# Patient Record
Sex: MALE | Race: NATIVE HAWAIIAN OR OTHER PACIFIC ISLANDER | HISPANIC OR LATINO
[De-identification: names, ages, dates, MRNs, and addresses within clinical notes are randomized per-mention and may not be internally consistent; named-entity substitution may affect disease eponyms.]

---

## 2020-09-16 PROBLEM — Z00.00 ENCOUNTER FOR PREVENTIVE HEALTH EXAMINATION: Status: ACTIVE | Noted: 2020-09-16

## 2020-10-16 ENCOUNTER — APPOINTMENT (OUTPATIENT)
Dept: GASTROENTEROLOGY | Facility: CLINIC | Age: 50
End: 2020-10-16

## 2020-10-29 ENCOUNTER — APPOINTMENT (OUTPATIENT)
Dept: OTOLARYNGOLOGY | Facility: CLINIC | Age: 50
End: 2020-10-29

## 2020-11-19 ENCOUNTER — APPOINTMENT (OUTPATIENT)
Dept: UROLOGY | Facility: CLINIC | Age: 50
End: 2020-11-19

## 2023-08-21 ENCOUNTER — EMERGENCY (EMERGENCY)
Facility: HOSPITAL | Age: 53
LOS: 0 days | Discharge: HOME | End: 2023-08-21
Attending: STUDENT IN AN ORGANIZED HEALTH CARE EDUCATION/TRAINING PROGRAM
Payer: MEDICAID

## 2023-08-21 VITALS
OXYGEN SATURATION: 100 % | SYSTOLIC BLOOD PRESSURE: 139 MMHG | HEART RATE: 73 BPM | RESPIRATION RATE: 19 BRPM | DIASTOLIC BLOOD PRESSURE: 95 MMHG | TEMPERATURE: 99 F

## 2023-08-21 DIAGNOSIS — E11.9 TYPE 2 DIABETES MELLITUS WITHOUT COMPLICATIONS: ICD-10-CM
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
## 2023-08-21 DIAGNOSIS — M51.26 OTHER INTERVERTEBRAL DISC DISPLACEMENT, LUMBAR REGION: ICD-10-CM

## 2023-08-21 DIAGNOSIS — M54.50 LOW BACK PAIN, UNSPECIFIED: ICD-10-CM
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
## 2023-08-21 PROCEDURE — 99284 EMERGENCY DEPT VISIT MOD MDM: CPT

## 2023-08-21 PROCEDURE — 72131 CT LUMBAR SPINE W/O DYE: CPT | Mod: 26,MA

## 2023-08-21 PROCEDURE — 96372 THER/PROPH/DIAG INJ SC/IM: CPT

## 2023-08-21 PROCEDURE — 99284 EMERGENCY DEPT VISIT MOD MDM: CPT | Mod: 25

## 2023-08-21 PROCEDURE — 72131 CT LUMBAR SPINE W/O DYE: CPT | Mod: MA

## 2023-08-21 RX ORDER — LIDOCAINE 4 G/100G
1 CREAM TOPICAL ONCE
Refills: 0 | Status: COMPLETED | OUTPATIENT
Start: 2023-08-21 | End: 2023-08-21

## 2023-08-21 RX ORDER — KETOROLAC TROMETHAMINE 30 MG/ML
30 SYRINGE (ML) INJECTION ONCE
Refills: 0 | Status: DISCONTINUED | OUTPATIENT
Start: 2023-08-21 | End: 2023-08-21

## 2023-08-21 RX ADMIN — LIDOCAINE 1 PATCH: 4 CREAM TOPICAL at 17:43

## 2023-08-21 RX ADMIN — Medication 30 MILLIGRAM(S): at 17:43

## 2023-08-21 NOTE — ED PROVIDER NOTE - CLINICAL SUMMARY MEDICAL DECISION MAKING FREE TEXT BOX
52-year-old male that presents with back pain.  Imaging pain management. pt neurovascularly intact. no signs of cord compression. Imaging was ordered and reviewed by me.  Appropriate medications for patient's presenting complaints were ordered and effects were reassessed.  Patient's records (prior hospital, ED visit, and/or nursing home notes if available) were reviewed.  Additional history was obtained from EMS, family, and/or PCP (where available).  Escalation to admission/observation was considered.  However patient feels much better and is comfortable with discharge.  Appropriate follow-up was arranged.     I have discussed the discharge plan with the patient. The patient agrees with the plan, as discussed.  The patient understands Emergency Department diagnosis is a preliminary diagnosis often based on limited information and that the patient must adhere to the follow-up plan as discussed.  The patient understands that if the symptoms worsen or if prescribed medications do not have the desired/planned effect that the patient may return to the Emergency Department at any time for further evaluation and treatment.

## 2023-08-21 NOTE — ED PROVIDER NOTE - ATTENDING APP SHARED VISIT CONTRIBUTION OF CARE
52-year-old male with a past medical history significant for diabetes who presents with back pain.  Patient states that for the last 2 weeks he has been having lower back pain after doing heavy lifting at work.  Patient denies any numbness tingling radiation IV drug use fecal urinary incontinence or any other medical complaints.    VITAL SIGNS: I have reviewed nursing notes and confirm.  CONSTITUTIONAL: non-toxic, well appearing  SKIN: no rash, no petechiae.  EYES: EOMI, pink conjunctiva, anicteric  ENT: tongue midline, no exudates, MMM  NECK: Supple; no meningismus, no JVD  CARD: RRR, no murmurs, equal radial pulses bilaterally 2+  RESP: CTAB, no respiratory distress  ABD: Soft, non-tender, non-distended, no peritoneal signs, no HSM, no CVA tenderness  EXT: Normal ROM x4. No edema. No calves tenderness. no midline tenderness-cervical/thoracic/lumbar  NEURO: Alert, oriented x3. CN2-12 intact, equal strength bilaterally, nl gait.  PSYCH: Cooperative, appropriate.    52-year-old male that presents with back pain.  Imaging pain management reassess dispo pending

## 2023-08-21 NOTE — ED PROVIDER NOTE - PATIENT PORTAL LINK FT
You can access the FollowMyHealth Patient Portal offered by [facility] by registering at the following website: [email or web address]. By joining [facility] FollowMyHealth portal, you will also be able to view your health information using other applications (apps) compatible with our system.

## 2023-08-21 NOTE — ED PROVIDER NOTE - NSFOLLOWUPINSTRUCTIONS_ED_ALL_ED_FT
Follow up with Specialists for further evaluation of your back pain/Spinal Stenosis.     What you need to know about making treatment decisions for your herniated disc: You can work with your healthcare provider to choose a treatment plan that works best for you. Your treatment choices include nonsurgical options and surgery. Your healthcare provider may recommend nonsurgical treatments first. Learn about the benefits and risks of nonsurgical treatment and surgery so you can make an informed choice.     What you need to know about a herniated disc: Aging increases your risk for a herniated disc because your discs weaken and shrink as you get older. Discs are natural, spongy cushions between the vertebrae (bones) in your spine. The bulging disc may press on your nerves or spinal cord. This pressure causes pain in your lower back, buttocks, groin, or legs. It can also cause numbness or weakness in one leg. Lumbar Disc Herniation       How a herniated disc is treated, and the benefits of treatment:     Nonsurgical treatment helps many people feel better within 6 weeks. Over a period of a year, it has been found to work as well as surgery in improving symptoms.  Physical therapy exercises can help strengthen your lower back and abdominal muscles.       Medicines, such as NSAIDs, help decrease swelling and pain. An example of an NSAID is ibuprofen. Muscle relaxers decrease pain and muscle spasms.      Epidural steroid injections help reduce swelling and pain for about 2 to 4 weeks. They may be recommended if there has been no pain relief after 6 weeks of treatment with physical therapy and NSAIDs.       Surgery can be done to fix your herniated disc if other treatments have failed. Surgery can be done to remove the herniated disc that is putting pressure on your spinal nerve. Surgery usually provides faster pain relief than nonsurgical treatment for most people.     Risks of treatment: Even with treatment and recovery, there is a chance that your symptoms may return or that you may develop another herniated disc.     Nonsurgical treatment takes more time to provide pain relief than surgery. Medicines must be taken in limited doses. NSAIDs can cause stomach bleeding or kidney problems in certain people. Steroid injections are usually limited to about 4 each year. This is because the medicine can weaken the bones in your spine and your muscles. Steroids can also increase your risk for other infections, cause changes in your mood, and increase blood sugar levels.       Surgery increases your risk for infection at the incision site and deep in the disc space. The protective layer of your spinal cord may tear or leak. This causes cerebrospinal fluid (CSF) to leak. You may have to lie flat for up to 7 days while the tear or leak heals. You may need disc surgery again. You may need surgery to remove your discs and fuse your vertebrae together. This surgery would limit movement in your back. Follow up with Specialists for further evaluation of your back pain/Spinal Stenosis.     Our Emergency Department Referral Coordinators will be reaching out to you in the next 24-48 hours from 9:00am to 5:00pm with a follow up appointment. Please expect a phone call from the hospital in that time frame. If you do not receive a call or if you have any questions or concerns, you can reach them at   [phone]      What you need to know about making treatment decisions for your herniated disc: You can work with your healthcare provider to choose a treatment plan that works best for you. Your treatment choices include nonsurgical options and surgery. Your healthcare provider may recommend nonsurgical treatments first. Learn about the benefits and risks of nonsurgical treatment and surgery so you can make an informed choice.     What you need to know about a herniated disc: Aging increases your risk for a herniated disc because your discs weaken and shrink as you get older. Discs are natural, spongy cushions between the vertebrae (bones) in your spine. The bulging disc may press on your nerves or spinal cord. This pressure causes pain in your lower back, buttocks, groin, or legs. It can also cause numbness or weakness in one leg. Lumbar Disc Herniation       How a herniated disc is treated, and the benefits of treatment:     Nonsurgical treatment helps many people feel better within 6 weeks. Over a period of a year, it has been found to work as well as surgery in improving symptoms.  Physical therapy exercises can help strengthen your lower back and abdominal muscles.       Medicines, such as NSAIDs, help decrease swelling and pain. An example of an NSAID is ibuprofen. Muscle relaxers decrease pain and muscle spasms.      Epidural steroid injections help reduce swelling and pain for about 2 to 4 weeks. They may be recommended if there has been no pain relief after 6 weeks of treatment with physical therapy and NSAIDs.       Surgery can be done to fix your herniated disc if other treatments have failed. Surgery can be done to remove the herniated disc that is putting pressure on your spinal nerve. Surgery usually provides faster pain relief than nonsurgical treatment for most people.     Risks of treatment: Even with treatment and recovery, there is a chance that your symptoms may return or that you may develop another herniated disc.     Nonsurgical treatment takes more time to provide pain relief than surgery. Medicines must be taken in limited doses. NSAIDs can cause stomach bleeding or kidney problems in certain people. Steroid injections are usually limited to about 4 each year. This is because the medicine can weaken the bones in your spine and your muscles. Steroids can also increase your risk for other infections, cause changes in your mood, and increase blood sugar levels.       Surgery increases your risk for infection at the incision site and deep in the disc space. The protective layer of your spinal cord may tear or leak. This causes cerebrospinal fluid (CSF) to leak. You may have to lie flat for up to 7 days while the tear or leak heals. You may need disc surgery again. You may need surgery to remove your discs and fuse your vertebrae together. This surgery would limit movement in your back.

## 2023-08-21 NOTE — ED PROVIDER NOTE - PHYSICAL EXAMINATION
As Follows:  CONST: Well appearing in NAD  EYES: PERRL, EOMI, Sclera and conjunctiva clear.   CARD: No murmurs, rubs, or gallops; Normal rate and rhythm  RESP: BS Equal B/L, No wheezes, rhonchi or rales. No distress or accessory breathing  GI: Soft, non-tender, non-distended.  MS: Normal ROM in all extremities. Tender to midline lumbar spine. No midline Cervical/Thoracic spinal tenderness.  SKIN: Warm, dry, no acute rashes. MMM  NEURO: A&Ox3, No focal deficits. Strength and sensation intact. Steady gait

## 2023-08-21 NOTE — ED PROVIDER NOTE - OBJECTIVE STATEMENT
Patient is 52-year-old male past medical history of diabetes presenting for evaluation of lumbar back pain over the past 2 weeks initially noticed after heavy lifting and sitting while at work.  Patient has noted continued pain radiating down his legs.  He has not tried any over-the-counter medications for pain.  He has not followed up with another doctor for evaluation of this back pain.  He denies any paresthesias, numbness, tingling, saddle paresthesia, other complaints at this time.

## 2023-08-21 NOTE — ED PROVIDER NOTE - ADDITIONAL NOTES AND INSTRUCTIONS:
Mr Worthington was seen in the Emergency Department on 8/21/23 and can return to school or work by the listed date with activity as tolerated.

## 2023-08-21 NOTE — ED PROVIDER NOTE - NS ED ATTENDING STATEMENT MOD
This was a shared visit with the CHARLA. I reviewed and verified the documentation and independently performed the documented:

## 2023-08-23 NOTE — CHART NOTE - NSCHARTNOTEFT_GEN_A_CORE
Neurosurgery follow up scheduled for 8/29/2023 at 200 pm with Dr. Marisela Aguilera [facility]. Pt aware of appt details.

## 2023-08-29 ENCOUNTER — APPOINTMENT (OUTPATIENT)
Dept: NEUROSURGERY | Facility: CLINIC | Age: 53
End: 2023-08-29
Payer: MEDICAID

## 2023-08-29 VITALS — BODY MASS INDEX: 30.21 KG/M2 | WEIGHT: 211 LBS | HEIGHT: 70 IN

## 2023-08-29 DIAGNOSIS — Z86.39 PERSONAL HISTORY OF OTHER ENDOCRINE, NUTRITIONAL AND METABOLIC DISEASE: ICD-10-CM

## 2023-08-29 DIAGNOSIS — Z78.9 OTHER SPECIFIED HEALTH STATUS: ICD-10-CM

## 2023-08-29 DIAGNOSIS — Z83.3 FAMILY HISTORY OF DIABETES MELLITUS: ICD-10-CM

## 2023-08-29 PROCEDURE — T1013: CPT

## 2023-08-29 PROCEDURE — 99204 OFFICE O/P NEW MOD 45 MIN: CPT

## 2023-08-29 RX ORDER — METFORMIN HYDROCHLORIDE 625 MG/1
TABLET ORAL
Refills: 0 | Status: ACTIVE | COMMUNITY

## 2023-08-29 NOTE — REVIEW OF SYSTEMS
[As Noted in HPI] : as noted in HPI [Difficulty Walking] : no difficulty walking [Frequent Falls] : not falling [Incontinence] : no incontinence

## 2023-08-29 NOTE — PLAN
[TextEntry] : Plan: conservative measures as listed below before revisiting  pain management for conservative medical management and consideration of left SI joint injection  physical therapy  imaging: xrays (hip/pelvis + lumbosacral with flexion + extension views)  Mr. CANDELARIO LEWIS is to return to the office once the abovementioned is completed. He may contact the office in the interim should he desire.  No concerns were identified during the visit and we thank him for allowing us to be a part of his care team.

## 2023-08-29 NOTE — END OF VISIT
[FreeTextEntry3] : I have independently evaluated and examined this patient, as well as independently reviewed his imaging and agree with above. [Time Spent: ___ minutes] : I have spent [unfilled] minutes of time on the encounter.

## 2023-08-29 NOTE — HISTORY OF PRESENT ILLNESS
[de-identified] : 52-year-old male presents the neurosurgery office today as a hospital discharge follow-up for low back pain with LLE radiculopathy x2 weeks.    used   He denies any pre-existing trauma, injury, or fall. The sensation is tingling in nature throughout the entire extremity. He takes Tylenol which does not provide relief of symptoms. He is able to ambulate steadily without any assistive devices and denies incontinence to bowel or bladder. Mr. CANDELARIO LEWIS presented to the ER on 8/21/2023 for an exacerbation. When asked, patient endorses that the pain worsens after sitting for a long period of time or when changing positions from sitting to standing. He denies any increase in pain level upon long periods of ambulation, no signs of neurogenic claudication.   CT performed on 8/21/2023 appreciated multilevel degenerative changes and disc herniations but no fracture  Upon physical examination, concern was noted to the left SI joint region. Patient verbalized that this area was the area of increased pain upon palpation. He is going to be referred to pain management to consider a left SI joint injection. He is also being given a script for physical therapy, outpatient, for 6-8 weeks. Lastly, xrays are to to be done. Mr. CANDELARIO LEWIS is going to return to the office to review results of the xrays, unless needed sooner, once performed and after consulting with both physical therapy and pain management in approximately 8 weeks so that a further plan can be delineated. He was agreeable to these objectives and denied any questions at this time.   Physical Exam: Constitutional: Well appearing, no distress HEENT: Normocephalic Atraumatic Psychiatric: Awake, alert, oriented to person, place, situation and time. Normal affect. Follows commands.  Language: Speech is clear, fluent with good repetition & comprehension. No dysarthria.  Pulmonary: No respiratory distress  Neurologic: CN II-XII grossly intact; EOMI with no nystagmus. No facial asymmetry bilaterally, full eye closure strength bilaterally. Hearing grossly normal. Head turning & shoulder shrug intact, bilaterally. Tongue midline, normal movements, no atrophy. Smile symmetrical.  Palpation: Pain noted to left SI joint upon palpation  Strength: Full strength in all major muscle groups Sensation: Full sensation to light touch in all extremities, V1-V3 sensation bilaterally intact.  Motor: No pronator drift, muscle strength of bilateral UE and bilateral LE: 5/5. Normal muscle tone.  Reflexes: DTR of biceps, knee and ankle normal  2+ biceps 2+ patellar   No Clonus No Schofield's  ROM  Straight-Leg Raise Test Left: Negative Straight-Leg Raise Test Right: Negative  Gait: No postural instability. Normal stance and walking without assistance.

## 2023-08-29 NOTE — REASON FOR VISIT
[New Patient Visit] : a new patient visit [[employer] ] : provided by [employer]   [Time Spent: ____ minutes] : Total time spent using  services: [unfilled] minutes. The patient's primary language is not English thus required  services. [Interpreters_IDNumber] : [id number] [Interpreters_FullName] : YAS [FreeTextEntry3] : TIME START 2:47PM [TWNoteComboBox1] : Malaysian

## 2023-10-24 ENCOUNTER — APPOINTMENT (OUTPATIENT)
Dept: NEUROSURGERY | Facility: CLINIC | Age: 53
End: 2023-10-24
Payer: MEDICAID

## 2023-10-24 VITALS — WEIGHT: 211 LBS | BODY MASS INDEX: 30.21 KG/M2 | HEIGHT: 70 IN

## 2023-10-24 PROCEDURE — 99215 OFFICE O/P EST HI 40 MIN: CPT

## 2023-10-24 PROCEDURE — T1013A: CUSTOM

## 2023-11-07 ENCOUNTER — APPOINTMENT (OUTPATIENT)
Dept: PAIN MANAGEMENT | Facility: CLINIC | Age: 53
End: 2023-11-07
Payer: MEDICAID

## 2023-11-07 VITALS
BODY MASS INDEX: 30.21 KG/M2 | HEART RATE: 75 BPM | SYSTOLIC BLOOD PRESSURE: 133 MMHG | DIASTOLIC BLOOD PRESSURE: 81 MMHG | WEIGHT: 211 LBS | HEIGHT: 70 IN

## 2023-11-07 DIAGNOSIS — M53.3 SACROCOCCYGEAL DISORDERS, NOT ELSEWHERE CLASSIFIED: ICD-10-CM

## 2023-11-07 DIAGNOSIS — G57.02 LESION OF SCIATIC NERVE, LEFT LOWER LIMB: ICD-10-CM

## 2023-11-07 PROCEDURE — 99204 OFFICE O/P NEW MOD 45 MIN: CPT

## 2023-11-21 ENCOUNTER — APPOINTMENT (OUTPATIENT)
Dept: PAIN MANAGEMENT | Facility: CLINIC | Age: 53
End: 2023-11-21

## 2023-11-30 ENCOUNTER — APPOINTMENT (OUTPATIENT)
Dept: PAIN MANAGEMENT | Facility: CLINIC | Age: 53
End: 2023-11-30

## 2023-12-06 ENCOUNTER — APPOINTMENT (OUTPATIENT)
Dept: PAIN MANAGEMENT | Facility: CLINIC | Age: 53
End: 2023-12-06
Payer: MEDICAID

## 2023-12-06 VITALS — BODY MASS INDEX: 30.21 KG/M2 | WEIGHT: 211 LBS | HEIGHT: 70 IN

## 2023-12-06 PROCEDURE — 99214 OFFICE O/P EST MOD 30 MIN: CPT

## 2024-01-08 ENCOUNTER — APPOINTMENT (OUTPATIENT)
Dept: PAIN MANAGEMENT | Facility: CLINIC | Age: 54
End: 2024-01-08
Payer: MEDICAID

## 2024-01-08 DIAGNOSIS — M46.1 SACROILIITIS, NOT ELSEWHERE CLASSIFIED: ICD-10-CM

## 2024-01-08 PROCEDURE — 27096 INJECT SACROILIAC JOINT: CPT | Mod: LT

## 2024-01-31 ENCOUNTER — APPOINTMENT (OUTPATIENT)
Dept: PAIN MANAGEMENT | Facility: CLINIC | Age: 54
End: 2024-01-31
